# Patient Record
Sex: MALE | Race: WHITE
[De-identification: names, ages, dates, MRNs, and addresses within clinical notes are randomized per-mention and may not be internally consistent; named-entity substitution may affect disease eponyms.]

---

## 2020-06-19 ENCOUNTER — HOSPITAL ENCOUNTER (EMERGENCY)
Dept: HOSPITAL 88 - FSED | Age: 80
Discharge: OTHER INSTITUTION | End: 2020-06-19
Payer: MEDICARE

## 2020-06-19 VITALS — BODY MASS INDEX: 21.16 KG/M2 | HEIGHT: 65 IN | WEIGHT: 127 LBS

## 2020-06-19 VITALS — SYSTOLIC BLOOD PRESSURE: 109 MMHG | DIASTOLIC BLOOD PRESSURE: 59 MMHG

## 2020-06-19 DIAGNOSIS — Z95.1: ICD-10-CM

## 2020-06-19 DIAGNOSIS — J44.1: ICD-10-CM

## 2020-06-19 DIAGNOSIS — I25.2: ICD-10-CM

## 2020-06-19 DIAGNOSIS — R91.8: ICD-10-CM

## 2020-06-19 DIAGNOSIS — R06.02: Primary | ICD-10-CM

## 2020-06-19 DIAGNOSIS — R62.7: ICD-10-CM

## 2020-06-19 DIAGNOSIS — R05: ICD-10-CM

## 2020-06-19 DIAGNOSIS — E78.5: ICD-10-CM

## 2020-06-19 DIAGNOSIS — J18.9: ICD-10-CM

## 2020-06-19 PROCEDURE — 93005 ELECTROCARDIOGRAM TRACING: CPT

## 2020-06-19 PROCEDURE — 84484 ASSAY OF TROPONIN QUANT: CPT

## 2020-06-19 PROCEDURE — 87040 BLOOD CULTURE FOR BACTERIA: CPT

## 2020-06-19 PROCEDURE — 80053 COMPREHEN METABOLIC PANEL: CPT

## 2020-06-19 PROCEDURE — 85025 COMPLETE CBC W/AUTO DIFF WBC: CPT

## 2020-06-19 PROCEDURE — 99284 EMERGENCY DEPT VISIT MOD MDM: CPT

## 2020-06-19 PROCEDURE — 81003 URINALYSIS AUTO W/O SCOPE: CPT

## 2020-06-19 PROCEDURE — 71045 X-RAY EXAM CHEST 1 VIEW: CPT

## 2020-06-19 NOTE — NUR
Initial contact with [facility] transfer center spoke with Romi-transfer 
coordinator. On hold awaiting ICU bed availability.

## 2020-06-19 NOTE — NUR
Per Dr. Agudelo patient and family would like to be transported to [facility] instead of [facility] due to already having established 
physicians there.

## 2020-06-19 NOTE — EMERGENCY DEPARTMENT NOTE
History of Present Illnes


History of Present Illness


Chief Complaint:  sent by b/c sob/high bp


History of Present Illness


This is a 79 year old  male. was doing well until 1 week ago then 

weight lost, then sob.


Historian:  Patient, Family Member


Arrival Mode:  Car


History limited by:  condition of the patient (normal)


 Required:  No


Onset (how long ago):  week(s) (1)


Location:  n/a


Quality:  n/a


Radiation:  Reports non-radiation


Severity:  severe


Onset quality:  gradual


Duration (how long):  week(s) (1)


Timing of current episode:  constant


Chronicity:  recurrent (copd)


Context:  Denies recent illness, Denies recent surgery, Denies recent 

immobilization, Denies recent travel, Denies trauma/injury, Denies new 

medications, Denies hx of DVT/PE, Denies non-compliance w/ medications


Relieving factors:  rest


Exacerbating factors:  movement


Associated symptoms:  Reports cough, Reports loss of appetite, Reports malaise, 

Reports shortness of breath


Treatments prior to arrival:  none





Past Medical/Family History


Physician Review


I have reviewed the patient's past medical and family history.  Any updates have

been documented here.





Past Medical History


Recent Fever:  No


Clinical Suspicion of Infectio:  No


New/Unexplained Change in Ment:  No


Past Medical History:  COPD, MI, GERD, Hyperlipedemia


Past Surgical History:  CABG


Other Surgery:  


Open heart; triple bypass; ulcer in stomach;  rectal bleeding





Social History


Smoking Cessation:  Former smoker


Counseling Performed:  No


Alcohol Use:  None


Any Illegal Drug Use:  No


TB Exposure/Symptoms:  No


Physically hurt or threatened:  No





Other


Last Tetanus:  UNK


Any Pre-Existing Lines (PICC,:  No


Is patient up to date on immun:  Yes


Last Flu:  2019


Last Pneumovax:  2019





Review of Systems


Review of Systems


Constitutional:  Reports as per HPI


EENTM:  Reports no symptoms


Cardiovascular:  Reports as per HPI


Respiratory:  Reports as per HPI


Gastrointestinal:  Reports no symptoms


Genitourinary:  Reports no symptoms


Musculoskeletal:  Reports no symptoms


Integumentary:  Reports no symptoms


Neurological:  Reports no symptoms


Psychological:  Reports no symptoms


Endocrine:  Reports no symptoms


Hematological/Lymphatic:  Reports no symptoms


Review of other systems:  All other systems negative





Physical Exam


Related Data


Allergies:  


Coded Allergies:  


     No Known Allergies (Unverified , 2/15/16)


Triage Vital Signs





Vital Signs








  Date Time  Temp Pulse Resp B/P (MAP) Pulse Ox O2 Delivery O2 Flow Rate FiO2


 


20 15:01 98.2 123 40 123/78 80   








Vital signs reviewed:  Yes





Physical Exam


CONSTITUTIONAL





Constitutional:  Present cachectic, Present distressed (respiratory)


HENT


HENT:  Present normocephalic, Present atraumatic, Present oropharynx 

clear/moist, Present nose normal


HENT L/R:  Present left ext ear normal, Present right ext ear normal


EYES





Eyes:  Reports PERRL, Reports conjunctivae normal


NECK


Neck:  Present ROM normal, Present supple


PULMONARY


Pulmonary:  Present respiratory distress (severe), Present other 

(wheezing/retraction)


CARDIOVASCULAR





Cardiovascular:  Present regular rhythm, Present heart sounds normal, Present 

intact distal pulses, Present capillary refill normal, Present tachycardia


GASTROINTESTINAL





Abdominal:  Present soft, Present nontender, Present bowel sounds normal


GENITOURINARY





Genitourinary:  Present exam deferred


SKIN


Skin:  Present warm, Present dry


MUSCULOSKELETAL





Musculoskeletal:  Present ROM normal


NEUROLOGICAL





Neurological:  Present alert, Present oriented x 3, Present no gross motor or 

sensory deficits


PSYCHOLOGICAL


Psychological:  Present mood/affect normal, Present judgement normal





Results


Laboratory


Lab results reviewed:  Yes (cbc/cmp nL,  'S CARDIAC ENZYMES PENDING)





Imaging


Imaging results reviewed:  Yes


Impressions


                                        


                        [place] [id number]








Patient Name: ERIC RODRIGUEZ                          MR #: [id number]     


        


: 1940                         Age/Sex: 79/M


Acct #: [id number]                    Req #: [id number]


Adm Physician:                                      


Ordered by: VIJI JOHNSON                     Report #: [id number] 


Location: [facility]                          Room/Bed:           


                                                                                


________________________________________________________________________________


___________________





Procedure: [id number] HOPD/CXR 1 VEW - HOPD


Exam Date: 20                            Exam Time: 1607








                              REPORT STATUS: Signed





EXAMINATION:  CXR 1 VEW - HOPD    





INDICATION: Shortness of breath





COMPARISON: Chest radiograph 10/16/2019


     


FINDINGS:





LINES/TUBES:EKG leads overlie the chest.





LUNGS:The left lung is well-inflated. Right lung volume is low. There is a


large right upper lung opacity which may represent right upper lobe mass versus


consolidation.





PLEURA:Right apical pleural thickening/effusion.





MEDIASTINUM:The cardiomediastinal silhouette appears unchanged normal in size


and shape.





BONES/SOFT TISSUES:No acute osseous injury. Sternotomy wires in place.





ABDOMEN:No free air under the diaphragm.








IMPRESSION: 


Large right upper lung opacity may represent right upper lobe mass versus


consolidation. Right apical pleural thickening versus effusion.





RECOMMENDATIONS:


Chest CT for further evaluation.





Signed by: Darío Richmond MD on 2020 4:13 PM








Dictated By: DARÍO RICHMOND MD


Electronically Signed By: DARÍO RICHMOND MD on 20 1613


Transcribed By: SHARONA on 20 1613 








COPY TO:   VIJI JOHNSON~





Procedures


12 Lead ECG Interpretation


ECG Interpretation :  


   ECG:  ECG 1


   :  Interpreted by ED physician


   Date:  2020


   Time:  15:01


   Prior ECG tracings:  reviewed


   Rhythm:  sinus tachycardia


   Rate:  tachycardia


   BPM:  125


   QRS axis:  normal


   ST segments normal:  Yes


   T waves normal:  Yes


   Clinical Impression:  abnormal ECG





Critical Care Time


Total Critical Care Time (min):  120


Critical care time exclusive o:  treating other patients


Critcal care necessary due to:  respiratory failure


Critcal care time spent by me:  develop tx plan w patient/surrogate, evaluation 

patient response to tx, examination of patient, order/perform tx or 

interventions, order/review laboratory studies, order/review radiographic 

studies, pulse oximetry, re-evaluation of patient condition





Assessment & Plan


Medical Decision Making


[facility]


RULE OUT PE// SPOKE TO DR NORMAN AT 1650HRS AND ACCEPTS TRANSFER OF PT TO 

[facility]





Reassessment


Reassessment


AFTER MULTIPLE EXAMS NO MORE DISTRESS s/p meds





Assessment & Plan


Final Impression:  


(1) Acute respiratory failure


(2) COPD exacerbation


(3) Pneumonia


(4) Lung mass


(5) Failure to thrive


Last Vital Signs











  Date Time  Temp Pulse Resp B/P (MAP) Pulse Ox O2 Delivery O2 Flow Rate FiO2


 


20 16:30 98.2 115 24 115/63 100   








Home Meds


Reported Medications


Omega-3 Fatty Acids/Fish Oil (FISH OIL 1,000 MG CAPSULE) 1 Each Capsule, 1 MG PO

DAILY


   10/11/19


Ascorbic Acid (VITAMIN C) 60 Mg Lozenge, PO DAILY


   10/11/19


Mu-Vits-Min Th/Lycopene/Lutein (CENTRUM SILVER TABLET) 1 Each Tablet, 1 TAB PO 

DAILY


   10/11/19


Albuterol Sulf* (PROAIR HFA INHALER*) 8.5 Gm Inh, 2 INH PO DAILY


   2/15/16


Clopidogrel Bisulfate* (PLAVIX) 75 Mg Tablet, 75 MG PO DAILY, #30 TAB


   2/15/16


Carvedilol (CARVEDILOL) 12.5 Mg Tablet, 3.125 MG PO BID, #60 TAB


   2/15/16


Atorvastatin Calcium (ATORVASTATIN CALCIUM) 20 Mg Tablet, 20 MG PO DAILY, TAB


   2/15/16


Tiotropium Bromide (SPIRIVA) 18 Mcg Cap.w.dev, 18 MCG INH DAILY, BOTTLE


   2/15/16


Medications in the ED





Albuterol/ Ipratropium 9 ml STK-MED ONCE .ROUTE ;  Start 20 at 15:32;  Stop

20 at 15:26;  Status DC


Albuterol/ Ipratropium 9 ml ONCE  ONCE NEB  Last administered on 20at 

15:35; Admin Dose 9 ML;  Start 20 at 15:30;  Stop 20 at 15:49;  Status

DC


Methylprednisolone Sodium Succinate 125 mg ONCE  ONCE IV  Last administered on 

20at 15:45; Admin Dose 125 MG;  Start 20 at 15:30;  Stop 20 at 

15:32;  Status DC


Ceftriaxone Sodium 50 ml @  100 mls/hr ONCE  ONCE IV  Last administered on at 15:45; Admin Dose 100 MLS/HR;  Start 20 at 15:30;  Stop 20 at 

15:59;  Status DC


Azithromycin 250 ml @  250 mls/hr NOW  ONCE IV ;  Start 20 at 16:00;  Stop 

20 at 16:59;  Status DC


Methylprednisolone Sodium Succinate 125 mg STK-MED ONCE .ROUTE ;  Start 20 

at 15:48;  Stop 20 at 15:46;  Status DC


Cefepime HCl 1 gm ONCE  STAT IV ;  Start 20 at 16:03;  Stop 20 at 

16:04;  Status UNV


Cefepime HCl 50 ml @  100 mls/hr ONCE IV ;  Start 20 at 16:30;  Stop 

20 at 23:59


Cefepime HCl 50 ml @ ud STK-MED ONCE IV ;  Start 20 at 16:51;  Stop 20

at 16:48;  Status DC











VIJI JOHNSON              2020 17:37

## 2020-06-19 NOTE — DIAGNOSTIC IMAGING REPORT
EXAMINATION:  CXR 1 [facility] \[facility]\""    



INDICATION: Shortness of breath



COMPARISON: Chest radiograph 10/16/2019

     

FINDINGS:



LINES/TUBES:EKG leads overlie the chest.



LUNGS:The left lung is well-inflated. Right lung volume is low. There is a

large right upper lung opacity which may represent right upper lobe mass versus

consolidation.



PLEURA:Right apical pleural thickening/effusion.



MEDIASTINUM:The cardiomediastinal silhouette appears unchanged normal in size

and shape.



BONES/SOFT TISSUES:No acute osseous injury. Sternotomy wires in place.



ABDOMEN:No free air under the diaphragm.





IMPRESSION: 

Large right upper lung opacity may represent right upper lobe mass versus

consolidation. Right apical pleural thickening versus effusion.



RECOMMENDATIONS:

Chest CT for further evaluation.



Signed by: Chi Pruitt MD on 6/19/2020 4:13 PM

## 2020-06-19 NOTE — NUR
Spoke with transfer center RN and ED charge RN -[phone] informed her that 
patient is being transferred for ICU bed for Respiratory failure, COPD 
exacerbation, and possible Pneumonia, informed that patient will be transferred 
in isolation due to unable to r/o COVID. Charge RN stated they test every 
admission on arrival and they will rule out COVID when patient arrives at 
facility. CORY Jensen and Dr. Agudelo notified.